# Patient Record
Sex: FEMALE
[De-identification: names, ages, dates, MRNs, and addresses within clinical notes are randomized per-mention and may not be internally consistent; named-entity substitution may affect disease eponyms.]

---

## 2017-03-27 ENCOUNTER — APPOINTMENT (OUTPATIENT)
Dept: OBGYN | Facility: CLINIC | Age: 57
End: 2017-03-27

## 2017-03-27 VITALS
WEIGHT: 105.03 LBS | BODY MASS INDEX: 19.83 KG/M2 | HEIGHT: 61 IN | SYSTOLIC BLOOD PRESSURE: 112 MMHG | DIASTOLIC BLOOD PRESSURE: 64 MMHG

## 2017-03-27 LAB
BILIRUB UR QL STRIP: NORMAL
COLLECTION METHOD: NORMAL
GLUCOSE UR-MCNC: NORMAL
HCG UR QL: 0.2 EU/DL
HEMOCCULT SP1 STL QL: NEGATIVE
HGB UR QL STRIP.AUTO: ABNORMAL
KETONES UR-MCNC: ABNORMAL
LEUKOCYTE ESTERASE UR QL STRIP: NORMAL
NITRITE UR QL STRIP: NORMAL
PH UR STRIP: 6
PROT UR STRIP-MCNC: NORMAL
SP GR UR STRIP: 1.02

## 2017-03-27 RX ORDER — UBIDECARENONE/VIT E ACET 100MG-5
CAPSULE ORAL
Refills: 0 | Status: ACTIVE | COMMUNITY

## 2017-03-28 LAB
CALCIUM SERPL-MCNC: 9.5 MG/DL
PARATHYROID HORMONE INTACT: 53 PG/ML
T3 SERPL-MCNC: 85 NG/DL
T4 FREE SERPL-MCNC: 1 NG/DL
TSH SERPL-ACNC: 2.35 UIU/ML

## 2017-04-03 LAB
BACTERIA UR CULT: NORMAL
CYTOLOGY CVX/VAG DOC THIN PREP: NORMAL
HPV HIGH+LOW RISK DNA PNL CVX: NEGATIVE

## 2017-08-01 ENCOUNTER — RX RENEWAL (OUTPATIENT)
Age: 57
End: 2017-08-01

## 2017-08-30 ENCOUNTER — OTHER (OUTPATIENT)
Age: 57
End: 2017-08-30

## 2017-12-31 ENCOUNTER — TRANSCRIPTION ENCOUNTER (OUTPATIENT)
Age: 57
End: 2017-12-31

## 2018-04-16 ENCOUNTER — APPOINTMENT (OUTPATIENT)
Dept: OBGYN | Facility: CLINIC | Age: 58
End: 2018-04-16
Payer: COMMERCIAL

## 2018-04-16 VITALS
WEIGHT: 104 LBS | SYSTOLIC BLOOD PRESSURE: 109 MMHG | HEIGHT: 61 IN | BODY MASS INDEX: 19.63 KG/M2 | DIASTOLIC BLOOD PRESSURE: 62 MMHG

## 2018-04-16 DIAGNOSIS — R92.8 OTHER ABNORMAL AND INCONCLUSIVE FINDINGS ON DIAGNOSTIC IMAGING OF BREAST: ICD-10-CM

## 2018-04-16 LAB
BILIRUB UR QL STRIP: NORMAL
COLLECTION METHOD: NORMAL
GLUCOSE UR-MCNC: NORMAL
HCG UR QL: 0.2 EU/DL
HEMOCCULT SP1 STL QL: NEGATIVE
HGB UR QL STRIP.AUTO: ABNORMAL
KETONES UR-MCNC: NORMAL
LEUKOCYTE ESTERASE UR QL STRIP: NORMAL
NITRITE UR QL STRIP: NORMAL
PH UR STRIP: 5.5
PROT UR STRIP-MCNC: NORMAL
SP GR UR STRIP: <=1.005

## 2018-04-16 PROCEDURE — 82270 OCCULT BLOOD FECES: CPT

## 2018-04-16 PROCEDURE — 81003 URINALYSIS AUTO W/O SCOPE: CPT | Mod: QW

## 2018-04-16 PROCEDURE — 99396 PREV VISIT EST AGE 40-64: CPT

## 2018-04-16 RX ORDER — TURMERIC 95 %
POWDER (GRAM) MISCELLANEOUS
Refills: 0 | Status: ACTIVE | COMMUNITY

## 2018-04-18 LAB — BACTERIA UR CULT: NORMAL

## 2019-07-05 ENCOUNTER — TRANSCRIPTION ENCOUNTER (OUTPATIENT)
Age: 59
End: 2019-07-05

## 2019-07-07 ENCOUNTER — TRANSCRIPTION ENCOUNTER (OUTPATIENT)
Age: 59
End: 2019-07-07

## 2019-07-15 ENCOUNTER — APPOINTMENT (OUTPATIENT)
Dept: OBGYN | Facility: CLINIC | Age: 59
End: 2019-07-15
Payer: COMMERCIAL

## 2019-07-15 VITALS
HEIGHT: 61 IN | DIASTOLIC BLOOD PRESSURE: 62 MMHG | BODY MASS INDEX: 19.63 KG/M2 | WEIGHT: 104 LBS | SYSTOLIC BLOOD PRESSURE: 109 MMHG

## 2019-07-15 DIAGNOSIS — R31.29 OTHER MICROSCOPIC HEMATURIA: ICD-10-CM

## 2019-07-15 DIAGNOSIS — Z87.42 PERSONAL HISTORY OF OTHER DISEASES OF THE FEMALE GENITAL TRACT: ICD-10-CM

## 2019-07-15 PROCEDURE — 82270 OCCULT BLOOD FECES: CPT

## 2019-07-15 PROCEDURE — 81003 URINALYSIS AUTO W/O SCOPE: CPT | Mod: QW

## 2019-07-15 PROCEDURE — 99396 PREV VISIT EST AGE 40-64: CPT

## 2019-07-15 NOTE — HISTORY OF PRESENT ILLNESS
[1 Year Ago] : 1 year ago [Good] : being in good health [Healthy Diet] : a healthy diet [Regular Exercise] : regular exercise [Weight Concerns] : weight concens [Last Mammogram ___] : Last Mammogram was [unfilled] [Last Bone Density ___] : Last bone density studies [unfilled] [Last Pap ___] : Last cervical pap smear was [unfilled] [Postmenopausal] : is postmenopausal [Menstrual Problems] : reports abnormal menses [Definite ___ (Date)] : the last menstrual period was [unfilled] [Definite:  ___ (Date)] : the last menstrual period was [unfilled] [Currently In Menopause] : currently in menopause [Sexually Active] : is sexually active [Monogamous] : is monogamous [Male ___] : [unfilled] male [Last Colonoscopy ___] : Last colonoscopy [unfilled] [de-identified] : WALKING, YOGA, DANCE [Hot Flashes] : no hot flashes [Night Sweats] : no night sweats [Vaginal Itching] : no vaginal itching [Dyspareunia] : no dyspareunia

## 2019-07-15 NOTE — REVIEW OF SYSTEMS
[Breast Pain] : breast pain [Sleep Disturbances] : sleep disturbances [Nl] : Musculoskeletal [Breast Lump] : no breast lump

## 2019-07-17 LAB
APPEARANCE: CLEAR
BACTERIA UR CULT: NORMAL
BACTERIA: NEGATIVE
BILIRUBIN URINE: NEGATIVE
BLOOD URINE: NEGATIVE
COLOR: YELLOW
GLUCOSE QUALITATIVE U: NEGATIVE
HYALINE CASTS: 1 /LPF
KETONES URINE: NORMAL
LEUKOCYTE ESTERASE URINE: NEGATIVE
MICROSCOPIC-UA: NORMAL
NITRITE URINE: NEGATIVE
PH URINE: 6
PROTEIN URINE: NORMAL
RED BLOOD CELLS URINE: 6 /HPF
SPECIFIC GRAVITY URINE: 1.03
SQUAMOUS EPITHELIAL CELLS: 1 /HPF
UROBILINOGEN URINE: NORMAL
WHITE BLOOD CELLS URINE: 1 /HPF

## 2020-01-15 ENCOUNTER — APPOINTMENT (OUTPATIENT)
Dept: DERMATOLOGY | Facility: CLINIC | Age: 60
End: 2020-01-15

## 2020-01-27 ENCOUNTER — APPOINTMENT (OUTPATIENT)
Dept: OBGYN | Facility: CLINIC | Age: 60
End: 2020-01-27
Payer: COMMERCIAL

## 2020-01-27 VITALS
SYSTOLIC BLOOD PRESSURE: 113 MMHG | WEIGHT: 104 LBS | DIASTOLIC BLOOD PRESSURE: 62 MMHG | BODY MASS INDEX: 19.63 KG/M2 | HEIGHT: 61 IN

## 2020-01-27 PROCEDURE — 99214 OFFICE O/P EST MOD 30 MIN: CPT

## 2020-02-05 ENCOUNTER — APPOINTMENT (OUTPATIENT)
Dept: DERMATOLOGY | Facility: CLINIC | Age: 60
End: 2020-02-05

## 2020-07-14 ENCOUNTER — APPOINTMENT (OUTPATIENT)
Dept: OBGYN | Facility: CLINIC | Age: 60
End: 2020-07-14
Payer: COMMERCIAL

## 2020-07-14 VITALS
SYSTOLIC BLOOD PRESSURE: 116 MMHG | DIASTOLIC BLOOD PRESSURE: 70 MMHG | HEIGHT: 61 IN | WEIGHT: 104 LBS | BODY MASS INDEX: 19.63 KG/M2

## 2020-07-14 DIAGNOSIS — N64.4 MASTODYNIA: ICD-10-CM

## 2020-07-14 PROCEDURE — 99213 OFFICE O/P EST LOW 20 MIN: CPT

## 2020-07-14 NOTE — CHIEF COMPLAINT
[Urgent Visit] : Urgent Visit [FreeTextEntry1] : C/O BILATERAL BREAST TENDERNESS X 1 WEEK.  NO DISCHARGE OR SKIN CHANGES.  TENDERNESS PRESENT WITH PALPATION ONLY.   BREASTS NOTED TO BE SLIGHTLY LARGER IN BRA, BUT UNSURE IF WEIGHT GAIN.  HAS INCREASED COFFEE INTAKE PAST MONTH.  NO NEW MEDICATIONS.  LAST MAMMO 12/2018.\par \par Results of bone density reviewed with patient.  We discussed diet, exercise, calcium, vitamin D, smoking cessation, family history.  Fall precautions given. Discussed anti-resorptive agents, hormones and hormone agonists and expectant management.\par  MOTHER IS 89 YEARS OLD, ON FOSAMAX "FOR 20 YEARS NOW.)\par

## 2020-07-14 NOTE — PHYSICAL EXAM
[Awake] : awake [Alert] : alert [Acute Distress] : no acute distress [Mass] : no breast mass [Nipple Discharge] : no nipple discharge [Axillary LAD] : no axillary lymphadenopathy [Examination Of The Breasts] : a normal appearance [No Discharge] : no discharge [No Masses] : no breast masses were palpable [Axillary Lymph Nodes Enlarged Bilaterally] : no enlarged nodes [Soft] : soft [Tender] : non tender [Oriented x3] : oriented to person, place, and time [Normal] : cervix [No Bleeding] : there was no active vaginal bleeding

## 2020-07-17 LAB
CYTOLOGY CVX/VAG DOC THIN PREP: NORMAL
HPV HIGH+LOW RISK DNA PNL CVX: NOT DETECTED

## 2021-03-03 ENCOUNTER — NON-APPOINTMENT (OUTPATIENT)
Age: 61
End: 2021-03-03

## 2021-03-03 DIAGNOSIS — R92.1 MAMMOGRAPHIC CALCIFICATION FOUND ON DIAGNOSTIC IMAGING OF BREAST: ICD-10-CM

## 2021-04-15 ENCOUNTER — APPOINTMENT (OUTPATIENT)
Dept: OBGYN | Facility: CLINIC | Age: 61
End: 2021-04-15
Payer: COMMERCIAL

## 2021-04-15 VITALS
TEMPERATURE: 97.4 F | SYSTOLIC BLOOD PRESSURE: 114 MMHG | WEIGHT: 106 LBS | DIASTOLIC BLOOD PRESSURE: 70 MMHG | HEIGHT: 60 IN | BODY MASS INDEX: 20.81 KG/M2

## 2021-04-15 DIAGNOSIS — N95.2 POSTMENOPAUSAL ATROPHIC VAGINITIS: ICD-10-CM

## 2021-04-15 DIAGNOSIS — Z23 ENCOUNTER FOR IMMUNIZATION: ICD-10-CM

## 2021-04-15 LAB
BILIRUB UR QL STRIP: NORMAL
CLARITY UR: CLEAR
COLLECTION METHOD: NORMAL
GLUCOSE UR-MCNC: NORMAL
HCG UR QL: 0.2 EU/DL
HGB UR QL STRIP.AUTO: ABNORMAL
KETONES UR-MCNC: NORMAL
LEUKOCYTE ESTERASE UR QL STRIP: NORMAL
NITRITE UR QL STRIP: NORMAL
PH UR STRIP: 5.5
PROT UR STRIP-MCNC: NORMAL
SP GR UR STRIP: 1.03

## 2021-04-15 PROCEDURE — 99072 ADDL SUPL MATRL&STAF TM PHE: CPT

## 2021-04-15 PROCEDURE — 99396 PREV VISIT EST AGE 40-64: CPT

## 2021-04-15 NOTE — HISTORY OF PRESENT ILLNESS
[Patient reported mammogram was normal] : Patient reported mammogram was normal [Patient reported PAP Smear was normal] : Patient reported PAP Smear was normal [Patient reported bone density results were abnormal] : Patient reported bone density results were abnormal [Yes] : Patient has concerns regarding sex [Currently Active] : currently active [Men] : men [Vaginal] : vaginal [No] : No [Mammogramdate] : 2/2021 [TextBox_19] : WAS 6 MONTH F/U TO 8/2020 EXAM, CALCIFICATIONS [PapSmeardate] : 7/2020 [BoneDensityDate] : 10/2019 [TextBox_37] : OSTEOPOROSIS WITH SIGNIFICANT LOSS [FreeTextEntry1] : VAGINAL DRYNESS, PAIN [FreeTextEntry2] :

## 2021-04-15 NOTE — PLAN
[FreeTextEntry1] : Results of bone density reviewed with patient.  We discussed diet, exercise, calcium, vitamin D, smoking cessation, family history.  Fall precautions given. Discussed anti-resorptive agents, hormones and hormone agonists and expectant management.\par CONSIDER JAIME OR LILIA.

## 2021-09-08 ENCOUNTER — NON-APPOINTMENT (OUTPATIENT)
Age: 61
End: 2021-09-08

## 2022-07-19 ENCOUNTER — APPOINTMENT (OUTPATIENT)
Dept: OBGYN | Facility: CLINIC | Age: 62
End: 2022-07-19

## 2022-08-25 DIAGNOSIS — N64.89 OTHER SPECIFIED DISORDERS OF BREAST: ICD-10-CM

## 2022-11-29 ENCOUNTER — NON-APPOINTMENT (OUTPATIENT)
Age: 62
End: 2022-11-29

## 2022-11-29 ENCOUNTER — APPOINTMENT (OUTPATIENT)
Dept: OBGYN | Facility: CLINIC | Age: 62
End: 2022-11-29

## 2022-11-29 VITALS
BODY MASS INDEX: 20.62 KG/M2 | DIASTOLIC BLOOD PRESSURE: 68 MMHG | SYSTOLIC BLOOD PRESSURE: 120 MMHG | WEIGHT: 105 LBS | HEIGHT: 60 IN

## 2022-11-29 PROCEDURE — 99396 PREV VISIT EST AGE 40-64: CPT

## 2022-11-29 NOTE — HISTORY OF PRESENT ILLNESS
[TextBox_4] : Annual [Mammogramdate] : 8/25/22 [TextBox_19] : br 2 [PapSmeardate] : 7/14/20 [TextBox_31] : negative [BoneDensityDate] : 10/13/19 [TextBox_37] : osteoporosis  [ColonoscopyDate] : 2020 [LMPDate] : 8 yrs ago [TextBox_6] : 8 yrs  [Yes] : Patient has concerns regarding sex [Currently Active] : currently active [No] : No [FreeTextEntry1] : VAGINAL DRYNESS [FreeTextEntry2] :

## 2023-10-13 ENCOUNTER — NON-APPOINTMENT (OUTPATIENT)
Age: 63
End: 2023-10-13

## 2023-10-18 ENCOUNTER — EMERGENCY (EMERGENCY)
Facility: HOSPITAL | Age: 63
LOS: 0 days | Discharge: HOME | End: 2023-10-18
Attending: EMERGENCY MEDICINE
Payer: COMMERCIAL

## 2023-10-18 VITALS
RESPIRATION RATE: 16 BRPM | OXYGEN SATURATION: 100 % | SYSTOLIC BLOOD PRESSURE: 102 MMHG | HEART RATE: 66 BPM | TEMPERATURE: 99 F | DIASTOLIC BLOOD PRESSURE: 70 MMHG

## 2023-10-18 VITALS — WEIGHT: 104.94 LBS | HEIGHT: 61 IN

## 2023-10-18 DIAGNOSIS — T43.291A POISONING BY OTHER ANTIDEPRESSANTS, ACCIDENTAL (UNINTENTIONAL), INITIAL ENCOUNTER: ICD-10-CM
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
## 2023-10-18 DIAGNOSIS — Z00.00 ENCOUNTER FOR GENERAL ADULT MEDICAL EXAMINATION WITHOUT ABNORMAL FINDINGS: ICD-10-CM
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
## 2023-10-18 PROCEDURE — 99282 EMERGENCY DEPT VISIT SF MDM: CPT

## 2023-10-18 PROCEDURE — 99285 EMERGENCY DEPT VISIT HI MDM: CPT

## 2023-10-18 PROCEDURE — 99283 EMERGENCY DEPT VISIT LOW MDM: CPT

## 2023-10-18 NOTE — ED STATDOCS - OBJECTIVE STATEMENT
64 yo F presents to ED c/o accidental medication error, pt reports she was eating a bag of nuts which her wellbutrin 300mg XR fell into, states she chewed the pill and swallowed it, pt called her pharmacist who advised that she may experience increased side effects but that it was fine, she then called her psych office and was referred to ER.  Pt feeling well at the moment no acute complaints.

## 2023-10-18 NOTE — ED STATDOCS - CLINICAL SUMMARY MEDICAL DECISION MAKING FREE TEXT BOX
pt s/p accidental medication error chewed wellbutrin 300mg xr prior to swallowing, feeling well currently, explained to pt as the extended release coating was removed medication may act quicker and be shorter acting but is not dangerous, will dc home with psych f/u, return precautions given

## 2023-10-18 NOTE — ED STATDOCS - PATIENT PORTAL LINK FT
You can access the FollowMyHealth Patient Portal offered by [facility] by registering at the following website: [email or web address]. By joining [facility]’s FollowMyHealth portal, you will also be able to view your health information using other applications (apps) compatible with our system.

## 2023-10-18 NOTE — ED STATDOCS - NSFOLLOWUPINSTRUCTIONS_ED_ALL_ED_FT
Bupropion Extended-Release Tablets (Depression/Mood Disorders)  What is this medication?  BUPROPION (byoo PROE pee on) treats depression. It increases norepinephrine and dopamine in the brain, hormones that help regulate mood. It belongs to a group of medications called NDRIs.    This medicine may be used for other purposes; ask your health care provider or pharmacist if you have questions.    COMMON BRAND NAME(S): Aplenzin, Budeprion XL, Forfivo XL, Wellbutrin XL    What should I tell my care team before I take this medication?  They need to know if you have any of these conditions:    An eating disorder, such as anorexia or bulimia  Bipolar disorder or psychosis  Diabetes or high blood sugar, treated with medication  Glaucoma  Head injury or brain tumor  Heart disease, previous heart attack, or irregular heart beat  High blood pressure  Kidney or liver disease  Seizures (convulsions)  Suicidal thoughts or a previous suicide attempt  Tourette's syndrome  Weight loss  An unusual or allergic reaction to bupropion, other medications, foods, dyes, or preservatives  Pregnant or trying to become pregnant  Breast-feeding  How should I use this medication?  Take this medication by mouth with a glass of water. Follow the directions on the prescription label. You can take it with or without food. If it upsets your stomach, take it with food. Do not crush, chew, or cut these tablets. This medication is taken once daily at the same time each day. Do not take your medication more often than directed. Do not stop taking this medication suddenly except upon the advice of your care team. Stopping this medication too quickly may cause serious side effects or your condition may worsen.    A special MedGuide will be given to you by the pharmacist with each prescription and refill. Be sure to read this information carefully each time.    Talk to your care team about the use of this medication in children. Special care may be needed.    Overdosage: If you think you have taken too much of this medicine contact a poison control center or emergency room at once.    NOTE: This medicine is only for you. Do not share this medicine with others.    What if I miss a dose?  If you miss a dose, skip the missed dose and take your next tablet at the regular time. Do not take double or extra doses.    What may interact with this medication?  Do not take this medication with any of the following:    Linezolid  MAOIs like Azilect, Carbex, Eldepryl, Marplan, Nardil, and Parnate  Methylene blue (injected into a vein)  Other medications that contain bupropion like Zyban  This medication may also interact with the following:    Alcohol  Certain medications for anxiety or sleep  Certain medications for blood pressure like metoprolol, propranolol  Certain medications for depression or psychotic disturbances  Certain medications for HIV or AIDS like efavirenz, lopinavir, nelfinavir, ritonavir  Certain medications for irregular heart beat like propafenone, flecainide  Certain medications for Parkinson's disease like amantadine, levodopa  Certain medications for seizures like carbamazepine, phenytoin, phenobarbital  Cimetidine  Clopidogrel  Cyclophosphamide  Digoxin  Furazolidone  Isoniazid  Nicotine  Orphenadrine  Procarbazine  Steroid medications like prednisone or cortisone  Stimulant medications for attention disorders, weight loss, or to stay awake  Tamoxifen  Theophylline  Thiotepa  Ticlopidine  Tramadol  Warfarin  This list may not describe all possible interactions. Give your health care provider a list of all the medicines, herbs, non-prescription drugs, or dietary supplements you use. Also tell them if you smoke, drink alcohol, or use illegal drugs. Some items may interact with your medicine.    What should I watch for while using this medication?  Tell your care team if your symptoms do not get better or if they get worse. Visit your care team for regular checks on your progress. Because it may take several weeks to see the full effects of this medication, it is important to continue your treatment as prescribed.    Watch for new or worsening thoughts of suicide or depression. This includes sudden changes in mood, behavior, or thoughts. These changes can happen at any time but are more common in the beginning of treatment or after a change in dose. Call your care team right away if you experience these thoughts or worsening depression.    Manic episodes may happen in patients with bipolar disorder who take this medication. Watch for changes in feelings or behaviors such as feeling anxious, nervous, agitated, panicky, irritable, hostile, aggressive, impulsive, severely restless, overly excited and hyperactive, or trouble sleeping. These symptoms can happen at anytime but are more common in the beginning of treatment or after a change in dose. Call your care team right away if you notice any of these symptoms.    This medication may cause serious skin reactions. They can happen weeks to months after starting the medication. Contact your care team right away if you notice fevers or flu-like symptoms with a rash. The rash may be red or purple and then turn into blisters or peeling of the skin. Or, you might notice a red rash with swelling of the face, lips or lymph nodes in your neck or under your arms.    Avoid drinks that contain alcohol while taking this medication. Drinking large amounts of alcohol, using sleeping or anxiety medications, or quickly stopping the use of these agents while taking this medication may increase your risk for a seizure.    Do not drive or use heavy machinery until you know how this medication affects you. This medication can impair your ability to perform these tasks.    Do not take this medication close to bedtime. It may prevent you from sleeping.    Your mouth may get dry. Chewing sugarless gum or sucking hard candy, and drinking plenty of water may help. Contact your care team if the problem does not go away or is severe.    The tablet shell for some brands of this medication does not dissolve. This is normal. The tablet shell may appear whole in the stool. This is not a cause for concern.    What side effects may I notice from receiving this medication?  Side effects that you should report to your care team as soon as possible:    Allergic reactions—skin rash, itching, hives, swelling of the face, lips, tongue, or throat  Increase in blood pressure  Mood and behavior changes—anxiety, nervousness, confusion, hallucinations, irritability, hostility, thoughts of suicide or self-harm, worsening mood, feelings of depression  Redness, blistering, peeling, or loosening of the skin, including inside the mouth  Seizures  Sudden eye pain or change in vision such as blurry vision, seeing halos around lights, vision loss  Side effects that usually do not require medical attention (report to your care team if they continue or are bothersome):    Constipation  Dizziness  Dry mouth  Loss of appetite  Nausea  Tremors or shaking  Trouble sleeping  This list may not describe all possible side effects. Call your doctor for medical advice about side effects. You may report side effects to FDA at 5-265-XNE-7154.    Where should I keep my medication?  Keep out of the reach of children and pets.    Store at room temperature between 15 and 30 degrees C (59 and 86 degrees F). Throw away any unused medication after the expiration date.    NOTE: This sheet is a summary. It may not cover all possible information. If you have questions about this medicine, talk to your doctor, pharmacist, or health care provider.

## 2023-10-18 NOTE — ED ADULT NURSE NOTE - CHIEF COMPLAINT QUOTE
Pt presents to ER c/o medication error. Pt reports her Wellbutrin 300xl fell into food she was eating and she chewed it up and swallowed it instead of swallowing to pill whole. Pt reports she took it at the appropriate time but is concerned she may experience side affects from chewing the medication. Pt reports she feels anxious, denies any other symptoms/SI/HI

## 2023-10-18 NOTE — ED STATDOCS - ATTENDING APP SHARED VISIT CONTRIBUTION OF CARE
Patient presents to ED after accidentally chewing her Wellbutrin no complaints at this time.  Okay for discharge.  Gen: Well appearing in NAD  Head: NC/AT  Neck: Trachea midline  Resp: No distress  Ext: No deformities  Neuro: A&O appears non focal  Skin: Warm and dry as visualized  Psych: Normal affect and mood

## 2023-10-18 NOTE — ED STATDOCS - NS ED ATTENDING STATEMENT MOD
This was a shared visit with the MELINA. I reviewed and verified the documentation and independently performed the documented:

## 2023-12-19 ENCOUNTER — APPOINTMENT (OUTPATIENT)
Dept: OBGYN | Facility: CLINIC | Age: 63
End: 2023-12-19
Payer: COMMERCIAL

## 2023-12-19 VITALS
BODY MASS INDEX: 19.83 KG/M2 | HEIGHT: 60 IN | WEIGHT: 101 LBS | DIASTOLIC BLOOD PRESSURE: 66 MMHG | SYSTOLIC BLOOD PRESSURE: 114 MMHG

## 2023-12-19 DIAGNOSIS — Z01.419 ENCOUNTER FOR GYNECOLOGICAL EXAMINATION (GENERAL) (ROUTINE) W/OUT ABNORMAL FINDINGS: ICD-10-CM

## 2023-12-19 DIAGNOSIS — R92.30 DENSE BREASTS, UNSPECIFIED: ICD-10-CM

## 2023-12-19 DIAGNOSIS — Z12.11 ENCOUNTER FOR SCREENING FOR MALIGNANT NEOPLASM OF COLON: ICD-10-CM

## 2023-12-19 DIAGNOSIS — M81.0 AGE-RELATED OSTEOPOROSIS W/OUT CURRENT PATHOLOGICAL FRACTURE: ICD-10-CM

## 2023-12-19 DIAGNOSIS — Z12.39 ENCOUNTER FOR OTHER SCREENING FOR MALIGNANT NEOPLASM OF BREAST: ICD-10-CM

## 2023-12-19 PROBLEM — Z78.9 OTHER SPECIFIED HEALTH STATUS: Chronic | Status: ACTIVE | Noted: 2023-10-18

## 2023-12-19 PROCEDURE — 99396 PREV VISIT EST AGE 40-64: CPT

## 2023-12-19 NOTE — PHYSICAL EXAM
[Chaperone Present] : A chaperone was present in the examining room during all aspects of the physical examination [FreeTextEntry1] : KAYLEE KWOK [Appropriately responsive] : appropriately responsive [Alert] : alert [No Acute Distress] : no acute distress [Soft] : soft [Non-tender] : non-tender [Non-distended] : non-distended [No HSM] : No HSM [No Lesions] : no lesions [No Mass] : no mass [Oriented x3] : oriented x3 [Examination Of The Breasts] : a normal appearance [No Masses] : no breast masses were palpable [Vulvar Atrophy] : vulvar atrophy [Labia Majora] : normal [Labia Minora] : normal [Atrophy] : atrophy [Normal] : normal [Uterine Adnexae] : non-palpable [No Tenderness] : no tenderness [Nl Sphincter Tone] : normal sphincter tone [FreeTextEntry9] : GUAIAC NEGATIVE

## 2023-12-19 NOTE — HISTORY OF PRESENT ILLNESS
[N] : Patient does not use contraception [Y] : Patient is sexually active [Currently Active] : currently active [Mammogramdate] : 08/25/23 [TextBox_19] : BR 1  [BreastSonogramDate] : 08/25/23 [TextBox_25] : BR 1  [PapSmeardate] : 07/14/20 [TextBox_31] : NEG  [BoneDensityDate] : 10/2023 [TextBox_37] : STABLE OSTEOPOROSIS [ColonoscopyDate] : 2018? [HPVDate] : 07/14/20 [TextBox_78] : NEG  [LMPDate] : 05/01/15 [FreeTextEntry1] : 05/01/15 [No] : No [FreeTextEntry2] :

## 2023-12-20 LAB — HPV HIGH+LOW RISK DNA PNL CVX: NOT DETECTED

## 2023-12-28 LAB — CYTOLOGY CVX/VAG DOC THIN PREP: ABNORMAL

## 2024-01-04 ENCOUNTER — APPOINTMENT (OUTPATIENT)
Dept: OBGYN | Facility: CLINIC | Age: 64
End: 2024-01-04

## 2024-02-22 ENCOUNTER — APPOINTMENT (OUTPATIENT)
Dept: PULMONOLOGY | Facility: CLINIC | Age: 64
End: 2024-02-22
Payer: COMMERCIAL

## 2024-02-22 VITALS — BODY MASS INDEX: 19.63 KG/M2 | HEIGHT: 61 IN | WEIGHT: 104 LBS

## 2024-02-22 VITALS
OXYGEN SATURATION: 98 % | SYSTOLIC BLOOD PRESSURE: 118 MMHG | HEART RATE: 73 BPM | DIASTOLIC BLOOD PRESSURE: 74 MMHG | RESPIRATION RATE: 16 BRPM

## 2024-02-22 DIAGNOSIS — R91.8 OTHER NONSPECIFIC ABNORMAL FINDING OF LUNG FIELD: ICD-10-CM

## 2024-02-22 PROCEDURE — 99204 OFFICE O/P NEW MOD 45 MIN: CPT

## 2024-02-22 RX ORDER — ASCORBIC ACID 125 MG
TABLET,CHEWABLE ORAL
Refills: 0 | Status: ACTIVE | COMMUNITY

## 2024-02-22 NOTE — PROCEDURE
[FreeTextEntry1] : CT chest 1/24 compared to 4/21 stable minute nodules, ? distal airway secretions vs wall thickening

## 2024-02-22 NOTE — CONSULT LETTER
[Dear  ___] : Dear  [unfilled], [Consult Letter:] : I had the pleasure of evaluating your patient, [unfilled]. [Sincerely,] : Sincerely, [Please see my note below.] : Please see my note below. [DrTae  ___] : Dr. PRAKASH [FreeTextEntry3] : [facility] Director Pulmonary Division Medical Director Respiratory Therapy [facility]

## 2024-02-22 NOTE — DISCUSSION/SUMMARY
[FreeTextEntry1] : Stable minute nodules 1/24- 4/21, no sig smoking hx, but Family hx Lung cancer Asymptomatic no vaping no hx asthma active , Yoga, dancing Check quantiferon, has granuloma- no exposure by hx Baseline spirometry , asthma profile if abnormal 3 months or sooner if needed, eventual repeat scan for airways 1 yr

## 2024-02-22 NOTE — HISTORY OF PRESENT ILLNESS
[Never] : never [TextBox_4] : Never smoker No vaping no hx asthma or lung disease Had low calcium score active dance, yoga works as

## 2024-02-22 NOTE — PHYSICAL EXAM
[No Acute Distress] : no acute distress [Normal Appearance] : normal appearance [Normal Rate/Rhythm] : normal rate/rhythm [Normal S1, S2] : normal s1, s2 [No Rubs] : no rubs [No Murmurs] : no murmurs [No Gallops] : no gallops [No Resp Distress] : no resp distress [No Acc Muscle Use] : no acc muscle use [Clear to Auscultation Bilaterally] : clear to auscultation bilaterally [Normal to Percussion] : normal to percussion [No Abnormalities] : no abnormalities [No Clubbing] : no clubbing [Normal Gait] : normal gait [No Edema] : no edema [No Cyanosis] : no cyanosis [FROM] : FROM [Normal Color/ Pigmentation] : normal color/ pigmentation [No Focal Deficits] : no focal deficits [Oriented x3] : oriented x3 [Normal Affect] : normal affect

## 2024-07-16 ENCOUNTER — APPOINTMENT (OUTPATIENT)
Dept: PULMONOLOGY | Facility: CLINIC | Age: 64
End: 2024-07-16

## 2024-08-26 ENCOUNTER — LABORATORY RESULT (OUTPATIENT)
Age: 64
End: 2024-08-26

## 2024-09-04 ENCOUNTER — APPOINTMENT (OUTPATIENT)
Dept: PULMONOLOGY | Facility: CLINIC | Age: 64
End: 2024-09-04
Payer: COMMERCIAL

## 2024-09-04 VITALS
DIASTOLIC BLOOD PRESSURE: 74 MMHG | BODY MASS INDEX: 19.63 KG/M2 | HEIGHT: 60 IN | WEIGHT: 100 LBS | OXYGEN SATURATION: 98 % | SYSTOLIC BLOOD PRESSURE: 116 MMHG | HEART RATE: 72 BPM | RESPIRATION RATE: 16 BRPM

## 2024-09-04 DIAGNOSIS — R91.8 OTHER NONSPECIFIC ABNORMAL FINDING OF LUNG FIELD: ICD-10-CM

## 2024-09-04 DIAGNOSIS — R93.89 ABNORMAL FINDINGS ON DIAGNOSTIC IMAGING OF OTHER SPECIFIED BODY STRUCTURES: ICD-10-CM

## 2024-09-04 PROCEDURE — 99214 OFFICE O/P EST MOD 30 MIN: CPT | Mod: 25

## 2024-09-04 PROCEDURE — 94010 BREATHING CAPACITY TEST: CPT

## 2024-09-04 NOTE — DISCUSSION/SUMMARY
[FreeTextEntry1] : Stable minute nodules 1/24- 4/21, no sig smoking hx, but Family hx Lung cancer Asymptomatic no vaping no hx asthma active , Yoga, dancing Check quantiferon negative Spirometry is normal repeat scan for airway RUL ordered, will call with results 6 months or sooner if needed,

## 2024-09-04 NOTE — CONSULT LETTER
[Dear  ___] : Dear  [unfilled], [Consult Letter:] : I had the pleasure of evaluating your patient, [unfilled]. [Please see my note below.] : Please see my note below. [Sincerely,] : Sincerely, [DrTae  ___] : Dr. PRAKASH [FreeTextEntry3] : Yunier [facility] DO [facility] Director Pulmonary Division Medical Director Respiratory Therapy [facility]

## 2024-09-04 NOTE — HISTORY OF PRESENT ILLNESS
[Never] : never [TextBox_4] : Never smoker No vaping no hx asthma or lung disease Had low calcium score active dance, yoga works as  Doing well, no acute pulmonary disease

## 2024-09-04 NOTE — PHYSICAL EXAM
[No Acute Distress] : no acute distress [Normal Appearance] : normal appearance [Normal Rate/Rhythm] : normal rate/rhythm [Normal S1, S2] : normal s1, s2 [No Murmurs] : no murmurs [No Rubs] : no rubs [No Gallops] : no gallops [No Resp Distress] : no resp distress [No Acc Muscle Use] : no acc muscle use [Clear to Auscultation Bilaterally] : clear to auscultation bilaterally [Normal to Percussion] : normal to percussion [No Abnormalities] : no abnormalities [Normal Gait] : normal gait [No Clubbing] : no clubbing [No Cyanosis] : no cyanosis [No Edema] : no edema [FROM] : FROM [Normal Color/ Pigmentation] : normal color/ pigmentation [No Focal Deficits] : no focal deficits [Oriented x3] : oriented x3 [Normal Affect] : normal affect

## 2024-09-04 NOTE — CONSULT LETTER
[Dear  ___] : Dear  [unfilled], [Consult Letter:] : I had the pleasure of evaluating your patient, [unfilled]. [Please see my note below.] : Please see my note below. [Sincerely,] : Sincerely, [DrTae  ___] : Dr. PRAKASH [FreeTextEntry3] : [facility]ier [facility] DO [facility] Director Pulmonary Division Medical Director Respiratory Therapy [facility]

## 2024-09-17 ENCOUNTER — NON-APPOINTMENT (OUTPATIENT)
Age: 64
End: 2024-09-17

## 2024-09-23 ENCOUNTER — TRANSCRIPTION ENCOUNTER (OUTPATIENT)
Age: 64
End: 2024-09-23

## 2025-01-07 ENCOUNTER — APPOINTMENT (OUTPATIENT)
Dept: OBGYN | Facility: CLINIC | Age: 65
End: 2025-01-07

## 2025-02-19 ENCOUNTER — APPOINTMENT (OUTPATIENT)
Dept: PULMONOLOGY | Facility: CLINIC | Age: 65
End: 2025-02-19

## 2025-02-20 ENCOUNTER — APPOINTMENT (OUTPATIENT)
Dept: PULMONOLOGY | Facility: CLINIC | Age: 65
End: 2025-02-20
Payer: COMMERCIAL

## 2025-02-20 VITALS
RESPIRATION RATE: 16 BRPM | WEIGHT: 100 LBS | DIASTOLIC BLOOD PRESSURE: 64 MMHG | HEIGHT: 61 IN | BODY MASS INDEX: 18.88 KG/M2 | HEART RATE: 72 BPM | OXYGEN SATURATION: 97 % | SYSTOLIC BLOOD PRESSURE: 98 MMHG

## 2025-02-20 VITALS — SYSTOLIC BLOOD PRESSURE: 100 MMHG | DIASTOLIC BLOOD PRESSURE: 65 MMHG

## 2025-02-20 DIAGNOSIS — R91.8 OTHER NONSPECIFIC ABNORMAL FINDING OF LUNG FIELD: ICD-10-CM

## 2025-02-20 DIAGNOSIS — E04.1 NONTOXIC SINGLE THYROID NODULE: ICD-10-CM

## 2025-02-20 PROCEDURE — 99214 OFFICE O/P EST MOD 30 MIN: CPT

## 2025-02-20 PROCEDURE — G2211 COMPLEX E/M VISIT ADD ON: CPT | Mod: NC

## 2025-02-21 ENCOUNTER — TRANSCRIPTION ENCOUNTER (OUTPATIENT)
Age: 65
End: 2025-02-21

## 2025-03-06 ENCOUNTER — APPOINTMENT (OUTPATIENT)
Dept: OBGYN | Facility: CLINIC | Age: 65
End: 2025-03-06
Payer: COMMERCIAL

## 2025-03-06 VITALS
HEIGHT: 61 IN | SYSTOLIC BLOOD PRESSURE: 100 MMHG | BODY MASS INDEX: 19.45 KG/M2 | WEIGHT: 103 LBS | DIASTOLIC BLOOD PRESSURE: 32 MMHG

## 2025-03-06 DIAGNOSIS — Z01.419 ENCOUNTER FOR GYNECOLOGICAL EXAMINATION (GENERAL) (ROUTINE) W/OUT ABNORMAL FINDINGS: ICD-10-CM

## 2025-03-06 DIAGNOSIS — N94.10 UNSPECIFIED DYSPAREUNIA: ICD-10-CM

## 2025-03-06 DIAGNOSIS — Z01.411 ENCOUNTER FOR GYNECOLOGICAL EXAMINATION (GENERAL) (ROUTINE) WITH ABNORMAL FINDINGS: ICD-10-CM

## 2025-03-06 DIAGNOSIS — Z12.39 ENCOUNTER FOR OTHER SCREENING FOR MALIGNANT NEOPLASM OF BREAST: ICD-10-CM

## 2025-03-06 DIAGNOSIS — Z12.11 ENCOUNTER FOR SCREENING FOR MALIGNANT NEOPLASM OF COLON: ICD-10-CM

## 2025-03-06 DIAGNOSIS — Z12.31 ENCOUNTER FOR SCREENING MAMMOGRAM FOR MALIGNANT NEOPLASM OF BREAST: ICD-10-CM

## 2025-03-06 DIAGNOSIS — M81.0 AGE-RELATED OSTEOPOROSIS W/OUT CURRENT PATHOLOGICAL FRACTURE: ICD-10-CM

## 2025-03-06 DIAGNOSIS — R92.30 DENSE BREASTS, UNSPECIFIED: ICD-10-CM

## 2025-03-06 DIAGNOSIS — N95.2 POSTMENOPAUSAL ATROPHIC VAGINITIS: ICD-10-CM

## 2025-03-06 DIAGNOSIS — Z23 ENCOUNTER FOR IMMUNIZATION: ICD-10-CM

## 2025-03-06 DIAGNOSIS — N89.8 OTHER SPECIFIED NONINFLAMMATORY DISORDERS OF VAGINA: ICD-10-CM

## 2025-03-06 PROCEDURE — 99204 OFFICE O/P NEW MOD 45 MIN: CPT | Mod: 25

## 2025-03-06 PROCEDURE — 99396 PREV VISIT EST AGE 40-64: CPT

## 2025-03-06 PROCEDURE — 99459 PELVIC EXAMINATION: CPT

## 2025-03-06 RX ORDER — ESTRADIOL 10 UG/1
10 TABLET VAGINAL
Qty: 3 | Refills: 3 | Status: ACTIVE | COMMUNITY
Start: 2025-03-06 | End: 1900-01-01